# Patient Record
Sex: MALE | Race: WHITE | NOT HISPANIC OR LATINO | Employment: OTHER | URBAN - NONMETROPOLITAN AREA
[De-identification: names, ages, dates, MRNs, and addresses within clinical notes are randomized per-mention and may not be internally consistent; named-entity substitution may affect disease eponyms.]

---

## 2021-12-23 ENCOUNTER — APPOINTMENT (OUTPATIENT)
Dept: GENERAL RADIOLOGY | Facility: OTHER | Age: 71
End: 2021-12-23
Attending: EMERGENCY MEDICINE
Payer: MEDICARE

## 2021-12-23 ENCOUNTER — HOSPITAL ENCOUNTER (EMERGENCY)
Facility: OTHER | Age: 71
Discharge: HOME | End: 2021-12-23
Attending: EMERGENCY MEDICINE | Admitting: EMERGENCY MEDICINE
Payer: MEDICARE

## 2021-12-23 VITALS
HEART RATE: 75 BPM | WEIGHT: 152.3 LBS | SYSTOLIC BLOOD PRESSURE: 128 MMHG | BODY MASS INDEX: 26 KG/M2 | DIASTOLIC BLOOD PRESSURE: 66 MMHG | HEIGHT: 64 IN | OXYGEN SATURATION: 93 % | TEMPERATURE: 97.9 F | RESPIRATION RATE: 11 BRPM

## 2021-12-23 DIAGNOSIS — R07.89 ATYPICAL CHEST PAIN: ICD-10-CM

## 2021-12-23 LAB
ALBUMIN SERPL-MCNC: 4.6 G/DL (ref 3.5–5.7)
ALP SERPL-CCNC: 52 U/L (ref 34–104)
ALT SERPL W P-5'-P-CCNC: 30 U/L (ref 7–52)
ANION GAP SERPL CALCULATED.3IONS-SCNC: 8 MMOL/L (ref 3–14)
AST SERPL W P-5'-P-CCNC: 18 U/L (ref 13–39)
BASOPHILS # BLD AUTO: 0 10E3/UL (ref 0–0.2)
BASOPHILS NFR BLD AUTO: 1 %
BILIRUB SERPL-MCNC: 0.4 MG/DL (ref 0.3–1)
BUN SERPL-MCNC: 14 MG/DL (ref 7–25)
CALCIUM SERPL-MCNC: 10 MG/DL (ref 8.6–10.3)
CHLORIDE BLD-SCNC: 101 MMOL/L (ref 98–107)
CO2 SERPL-SCNC: 28 MMOL/L (ref 21–31)
CREAT SERPL-MCNC: 0.91 MG/DL (ref 0.7–1.3)
EOSINOPHIL # BLD AUTO: 0.2 10E3/UL (ref 0–0.7)
EOSINOPHIL NFR BLD AUTO: 3 %
ERYTHROCYTE [DISTWIDTH] IN BLOOD BY AUTOMATED COUNT: 13.1 % (ref 10–15)
GFR SERPL CREATININE-BSD FRML MDRD: 89 ML/MIN/1.73M2
GLUCOSE BLD-MCNC: 157 MG/DL (ref 70–105)
HCT VFR BLD AUTO: 43 % (ref 40–53)
HGB BLD-MCNC: 14.7 G/DL (ref 13.3–17.7)
HOLD SPECIMEN: NORMAL
HOLD SPECIMEN: NORMAL
IMM GRANULOCYTES # BLD: 0 10E3/UL
IMM GRANULOCYTES NFR BLD: 0 %
LYMPHOCYTES # BLD AUTO: 1 10E3/UL (ref 0.8–5.3)
LYMPHOCYTES NFR BLD AUTO: 19 %
MCH RBC QN AUTO: 28.5 PG (ref 26.5–33)
MCHC RBC AUTO-ENTMCNC: 34.2 G/DL (ref 31.5–36.5)
MCV RBC AUTO: 83 FL (ref 78–100)
MONOCYTES # BLD AUTO: 0.4 10E3/UL (ref 0–1.3)
MONOCYTES NFR BLD AUTO: 7 %
NEUTROPHILS # BLD AUTO: 3.8 10E3/UL (ref 1.6–8.3)
NEUTROPHILS NFR BLD AUTO: 70 %
NRBC # BLD AUTO: 0 10E3/UL
NRBC BLD AUTO-RTO: 0 /100
PLATELET # BLD AUTO: 225 10E3/UL (ref 150–450)
POTASSIUM BLD-SCNC: 4 MMOL/L (ref 3.5–5.1)
PROT SERPL-MCNC: 6.9 G/DL (ref 6.4–8.9)
RBC # BLD AUTO: 5.16 10E6/UL (ref 4.4–5.9)
SODIUM SERPL-SCNC: 137 MMOL/L (ref 134–144)
TROPONIN I SERPL-MCNC: 3.4 PG/ML (ref 0–34)
TROPONIN I SERPL-MCNC: 4 PG/ML (ref 0–34)
WBC # BLD AUTO: 5.4 10E3/UL (ref 4–11)

## 2021-12-23 PROCEDURE — 36415 COLL VENOUS BLD VENIPUNCTURE: CPT | Performed by: EMERGENCY MEDICINE

## 2021-12-23 PROCEDURE — 93010 ELECTROCARDIOGRAM REPORT: CPT | Performed by: INTERNAL MEDICINE

## 2021-12-23 PROCEDURE — 84484 ASSAY OF TROPONIN QUANT: CPT | Performed by: EMERGENCY MEDICINE

## 2021-12-23 PROCEDURE — 93005 ELECTROCARDIOGRAM TRACING: CPT | Performed by: EMERGENCY MEDICINE

## 2021-12-23 PROCEDURE — 99285 EMERGENCY DEPT VISIT HI MDM: CPT | Mod: 25 | Performed by: EMERGENCY MEDICINE

## 2021-12-23 PROCEDURE — 71045 X-RAY EXAM CHEST 1 VIEW: CPT

## 2021-12-23 PROCEDURE — 99283 EMERGENCY DEPT VISIT LOW MDM: CPT | Performed by: EMERGENCY MEDICINE

## 2021-12-23 PROCEDURE — 80053 COMPREHEN METABOLIC PANEL: CPT | Performed by: EMERGENCY MEDICINE

## 2021-12-23 PROCEDURE — 250N000013 HC RX MED GY IP 250 OP 250 PS 637: Performed by: EMERGENCY MEDICINE

## 2021-12-23 PROCEDURE — 85025 COMPLETE CBC W/AUTO DIFF WBC: CPT | Performed by: EMERGENCY MEDICINE

## 2021-12-23 RX ORDER — ASPIRIN 81 MG/1
324 TABLET, CHEWABLE ORAL ONCE
Status: COMPLETED | OUTPATIENT
Start: 2021-12-23 | End: 2021-12-23

## 2021-12-23 RX ADMIN — ASPIRIN 81 MG CHEWABLE TABLET 324 MG: 81 TABLET CHEWABLE at 10:18

## 2021-12-23 ASSESSMENT — ENCOUNTER SYMPTOMS
ARTHRALGIAS: 0
CHILLS: 0
LIGHT-HEADEDNESS: 0
AGITATION: 0
DYSURIA: 0
CHEST TIGHTNESS: 0
NAUSEA: 0
SHORTNESS OF BREATH: 0
FEVER: 0
VOMITING: 0

## 2021-12-23 ASSESSMENT — MIFFLIN-ST. JEOR: SCORE: 1338.89

## 2021-12-23 NOTE — ED TRIAGE NOTES
EMS Arrival Note  ________________________________  Uriel ELENA Virgen is a 71 year old Male that arrives via [facility] ambulance service from home  Pre hospital clinical presentation per EMS personnel includes Pt was our shoveling at 0830 and experience 4/10 sharp, midsternal chest pain that lasted for approx 10 minutes .  Pre hospital personnel report vital signs of:  B/P 167/83; HR 90, RR 18;SpO2 96 room air.  Pre Hospital Cardiac rhythm reported as Normal Sinus  Patient arrives with:  GCS Total = 15  Airway intact  Breathing Assessment Normal  Circulation Assessment Normal  Patient arrives with a 20g IV at his right anticubital.  Placed in room 903, gowned, warm blanket provided, side rails up,  ID verified and band placed, and call light within reach.       Previous living situation Spouse

## 2021-12-23 NOTE — ED PROVIDER NOTES
"  History     Chief Complaint   Patient presents with     Chest Pain     HPI  Uriel Virgen is a 71 year old male who comes in by ambulance after an episode of chest pain. He said he was shoveling snow this morning and right towards the end of that he started noticing some midsternal sharp chest pain. He went in and sat down. He got up to move around and it seemed to make it worse. It lasted longer than normal, as he has had episodes like this in the past. Called 911. While the paramedics were there the pain resolved without any intervention. He denies any other symptoms such as shortness of breath lightheaded dizziness diaphoresis nausea or palpitations. Has not been feeling ill recently. Eating and drinking normally.    Allergies:  No Known Allergies    Problem List:    There are no problems to display for this patient.       Past Medical History:    No past medical history on file.    Past Surgical History:    No past surgical history on file.    Family History:    No family history on file.    Social History:  Marital Status:   [2]  Social History     Tobacco Use     Smoking status: Not on file     Smokeless tobacco: Not on file   Substance Use Topics     Alcohol use: Not on file     Drug use: Not on file        Medications:    No current outpatient medications on file.        Review of Systems   Constitutional: Negative for chills and fever.   HENT: Negative for congestion.    Eyes: Negative for visual disturbance.   Respiratory: Negative for chest tightness and shortness of breath.    Cardiovascular: Positive for chest pain.   Gastrointestinal: Negative for nausea and vomiting.   Genitourinary: Negative for dysuria.   Musculoskeletal: Negative for arthralgias.   Skin: Negative for rash.   Neurological: Negative for light-headedness.   Psychiatric/Behavioral: Negative for agitation.       Physical Exam   BP: (!) 169/88  Pulse: 89  Temp: 97.9  F (36.6  C)  Resp: 17  Height: 161.3 cm (5' 3.5\")  Weight: " 69.1 kg (152 lb 4.8 oz)  SpO2: 97 %      Physical Exam  Vitals and nursing note reviewed.   Constitutional:       Appearance: He is well-developed.   HENT:      Head: Normocephalic and atraumatic.      Mouth/Throat:      Mouth: Mucous membranes are moist.   Eyes:      Conjunctiva/sclera: Conjunctivae normal.   Cardiovascular:      Rate and Rhythm: Normal rate and regular rhythm.      Heart sounds: Normal heart sounds.   Pulmonary:      Effort: Pulmonary effort is normal.      Breath sounds: Normal breath sounds.   Abdominal:      General: Abdomen is flat.   Skin:     General: Skin is warm and dry.   Neurological:      Mental Status: He is alert and oriented to person, place, and time.   Psychiatric:         Mood and Affect: Mood normal.         Behavior: Behavior normal.         ED Course                 Procedures         EKG shows normal sinus rhythm 77 bpm. No acute ST segment or T wave changes. No ectopy.       Results for orders placed or performed during the hospital encounter of 12/23/21 (from the past 24 hour(s))   CBC with platelets differential    Narrative    The following orders were created for panel order CBC with platelets differential.  Procedure                               Abnormality         Status                     ---------                               -----------         ------                     CBC with platelets and d...[[id number]]                      Final result                 Please view results for these tests on the individual orders.   Troponin I   Result Value Ref Range    Troponin I 4.0 0.0 - 34.0 pg/mL   Comprehensive metabolic panel   Result Value Ref Range    Sodium 137 134 - 144 mmol/L    Potassium 4.0 3.5 - 5.1 mmol/L    Chloride 101 98 - 107 mmol/L    Carbon Dioxide (CO2) 28 21 - 31 mmol/L    Anion Gap 8 3 - 14 mmol/L    Urea Nitrogen 14 7 - 25 mg/dL    Creatinine 0.91 0.70 - 1.30 mg/dL    Calcium 10.0 8.6 - 10.3 mg/dL    Glucose 157 (H) 70 - 105 mg/dL    Alkaline  Phosphatase 52 34 - 104 U/L    AST 18 13 - 39 U/L    ALT 30 7 - 52 U/L    Protein Total 6.9 6.4 - 8.9 g/dL    Albumin 4.6 3.5 - 5.7 g/dL    Bilirubin Total 0.4 0.3 - 1.0 mg/dL    GFR Estimate 89 >60 mL/min/1.73m2   CBC with platelets and differential   Result Value Ref Range    WBC Count 5.4 4.0 - 11.0 10e3/uL    RBC Count 5.16 4.40 - 5.90 10e6/uL    Hemoglobin 14.7 13.3 - 17.7 g/dL    Hematocrit 43.0 40.0 - 53.0 %    MCV 83 78 - 100 fL    MCH 28.5 26.5 - 33.0 pg    MCHC 34.2 31.5 - 36.5 g/dL    RDW 13.1 10.0 - 15.0 %    Platelet Count 225 150 - 450 10e3/uL    % Neutrophils 70 %    % Lymphocytes 19 %    % Monocytes 7 %    % Eosinophils 3 %    % Basophils 1 %    % Immature Granulocytes 0 %    NRBCs per 100 WBC 0 <1 /100    Absolute Neutrophils 3.8 1.6 - 8.3 10e3/uL    Absolute Lymphocytes 1.0 0.8 - 5.3 10e3/uL    Absolute Monocytes 0.4 0.0 - 1.3 10e3/uL    Absolute Eosinophils 0.2 0.0 - 0.7 10e3/uL    Absolute Basophils 0.0 0.0 - 0.2 10e3/uL    Absolute Immature Granulocytes 0.0 <=0.4 10e3/uL    Absolute NRBCs 0.0 10e3/uL   Extra Tube ([place] Draw)    Narrative    The following orders were created for panel order Extra Tube ([place] Draw).  Procedure                               Abnormality         Status                     ---------                               -----------         ------                     Extra Blue Top Tube[[id number]]                              Final result               Extra Serum Separator Tu...[[id number]]                      Final result                 Please view results for these tests on the individual orders.   Extra Blue Top Tube   Result Value Ref Range    Hold Specimen JIC    Extra Serum Separator Tube (SST)   Result Value Ref Range    Hold Specimen JIC    XR Chest Port 1 View    Narrative    PROCEDURE:  XR CHEST PORT 1 VIEW    HISTORY: chest pain. .    COMPARISON:  None.    FINDINGS:    The cardiomediastinal contours are magnified by technique.  No focal consolidation,  effusion or pneumothorax.      Impression    IMPRESSION:  No discrete consolidation.      SOHA LIANG MD         SYSTEM ID:  [id number]   Troponin I   Result Value Ref Range    Troponin I 3.4 0.0 - 34.0 pg/mL       Medications   aspirin (ASA) chewable tablet 324 mg (324 mg Oral Given 12/23/21 1018)       Assessments & Plan (with Medical Decision Making)     I have reviewed the nursing notes.    I have reviewed the findings, diagnosis, plan and need for follow up with the patient.  Patient has been symptom-free since prior to arrival.  Normal EKG and normal serial troponins.  I see no evidence for any acute cardiac etiology to explain his symptoms.  This could be stable angina and we did discuss this.  While here in the ER they called to his clinic and have made an appointment for follow-up in 1 weeks time.  I think it would be reasonable for him to get fairly urgent stress test.  Return sooner if he has worsening symptoms.    New Prescriptions    No medications on file       Final diagnoses:   Atypical chest pain       12/23/2021   [facility]     [address] MD  12/23/21 1216

## 2021-12-28 LAB
ATRIAL RATE - MUSE: 77 BPM
DIASTOLIC BLOOD PRESSURE - MUSE: NORMAL MMHG
INTERPRETATION ECG - MUSE: NORMAL
P AXIS - MUSE: 39 DEGREES
PR INTERVAL - MUSE: 148 MS
QRS DURATION - MUSE: 86 MS
QT - MUSE: 352 MS
QTC - MUSE: 398 MS
R AXIS - MUSE: 11 DEGREES
SYSTOLIC BLOOD PRESSURE - MUSE: NORMAL MMHG
T AXIS - MUSE: 18 DEGREES
VENTRICULAR RATE- MUSE: 77 BPM

## 2025-05-27 ENCOUNTER — TRANSFERRED RECORDS (OUTPATIENT)
Dept: HEALTH INFORMATION MANAGEMENT | Facility: OTHER | Age: 75
End: 2025-05-27
Payer: COMMERCIAL

## 2025-05-27 ENCOUNTER — MEDICAL CORRESPONDENCE (OUTPATIENT)
Dept: HEALTH INFORMATION MANAGEMENT | Facility: OTHER | Age: 75
End: 2025-05-27

## 2025-05-27 ENCOUNTER — MEDICAL CORRESPONDENCE (OUTPATIENT)
Dept: HEALTH INFORMATION MANAGEMENT | Facility: OTHER | Age: 75
End: 2025-05-27
Payer: COMMERCIAL

## 2025-06-02 ENCOUNTER — TRANSFERRED RECORDS (OUTPATIENT)
Dept: HEALTH INFORMATION MANAGEMENT | Facility: OTHER | Age: 75
End: 2025-06-02
Payer: COMMERCIAL

## 2025-06-11 ENCOUNTER — OFFICE VISIT (OUTPATIENT)
Dept: UROLOGY | Facility: OTHER | Age: 75
End: 2025-06-11
Payer: COMMERCIAL

## 2025-06-11 VITALS
HEART RATE: 72 BPM | BODY MASS INDEX: 27.03 KG/M2 | OXYGEN SATURATION: 98 % | RESPIRATION RATE: 16 BRPM | SYSTOLIC BLOOD PRESSURE: 124 MMHG | DIASTOLIC BLOOD PRESSURE: 80 MMHG | WEIGHT: 155 LBS | TEMPERATURE: 97.1 F

## 2025-06-11 DIAGNOSIS — N40.1 BENIGN PROSTATIC HYPERPLASIA WITH URINARY FREQUENCY: ICD-10-CM

## 2025-06-11 DIAGNOSIS — N47.6 BALANOPOSTHITIS: Primary | ICD-10-CM

## 2025-06-11 DIAGNOSIS — R35.0 URINARY FREQUENCY: ICD-10-CM

## 2025-06-11 DIAGNOSIS — R35.0 BENIGN PROSTATIC HYPERPLASIA WITH URINARY FREQUENCY: ICD-10-CM

## 2025-06-11 LAB
ALBUMIN UR-MCNC: 20 MG/DL
APPEARANCE UR: CLEAR
BILIRUB UR QL STRIP: NEGATIVE
COLOR UR AUTO: ABNORMAL
GLUCOSE UR STRIP-MCNC: 200 MG/DL
HGB UR QL STRIP: NEGATIVE
KETONES UR STRIP-MCNC: NEGATIVE MG/DL
LEUKOCYTE ESTERASE UR QL STRIP: NEGATIVE
NITRATE UR QL: NEGATIVE
PH UR STRIP: 5.5 [PH] (ref 5–9)
SP GR UR STRIP: 1.02 (ref 1–1.03)
UROBILINOGEN UR STRIP-MCNC: NORMAL MG/DL

## 2025-06-11 PROCEDURE — G0463 HOSPITAL OUTPT CLINIC VISIT: HCPCS | Mod: 25

## 2025-06-11 PROCEDURE — 81003 URINALYSIS AUTO W/O SCOPE: CPT | Mod: ZL

## 2025-06-11 PROCEDURE — 51798 US URINE CAPACITY MEASURE: CPT

## 2025-06-11 RX ORDER — GLIPIZIDE 5 MG/1
TABLET ORAL
COMMUNITY
Start: 2025-05-28

## 2025-06-11 RX ORDER — LATANOPROST 50 UG/ML
SOLUTION/ DROPS OPHTHALMIC
COMMUNITY
Start: 2024-09-12

## 2025-06-11 RX ORDER — TIMOLOL MALEATE 5 MG/ML
1 SOLUTION/ DROPS OPHTHALMIC 2 TIMES DAILY
COMMUNITY
Start: 2025-04-11

## 2025-06-11 RX ORDER — BLOOD SUGAR DIAGNOSTIC
STRIP MISCELLANEOUS
COMMUNITY
Start: 2025-02-04

## 2025-06-11 RX ORDER — ROSUVASTATIN CALCIUM 10 MG/1
0.5 TABLET, COATED ORAL
COMMUNITY
Start: 2025-05-27

## 2025-06-11 RX ORDER — LANCETS
EACH MISCELLANEOUS
COMMUNITY
Start: 2024-12-14

## 2025-06-11 RX ORDER — TAMSULOSIN HYDROCHLORIDE 0.4 MG/1
0.4 CAPSULE ORAL DAILY
Qty: 30 CAPSULE | Refills: 11 | Status: SHIPPED | OUTPATIENT
Start: 2025-06-11

## 2025-06-11 RX ORDER — CLOTRIMAZOLE AND BETAMETHASONE DIPROPIONATE 10; .64 MG/G; MG/G
CREAM TOPICAL 2 TIMES DAILY
Qty: 15 G | Refills: 1 | Status: SHIPPED | OUTPATIENT
Start: 2025-06-11 | End: 2025-06-18

## 2025-06-11 RX ORDER — LOSARTAN POTASSIUM 50 MG/1
1 TABLET ORAL
COMMUNITY
Start: 2025-05-16

## 2025-06-11 ASSESSMENT — PAIN SCALES - GENERAL: PAINLEVEL_OUTOF10: NO PAIN (0)

## 2025-06-11 NOTE — NURSING NOTE
"Chief Complaint   Patient presents with    Consult     Urinary frequency, BPH       Initial /80   Pulse 72   Temp 97.1  F (36.2  C) (Tympanic)   Resp 16   Wt 70.3 kg (155 lb)   SpO2 98%   BMI 27.03 kg/m   Estimated body mass index is 27.03 kg/m  as calculated from the following:    Height as of 12/23/21: 1.613 m (5' 3.5\").    Weight as of this encounter: 70.3 kg (155 lb).    AUA-15  PVR-70    Courtney Jones, HERIBERTO      "

## 2025-06-11 NOTE — PATIENT INSTRUCTIONS
Drink plenty of fluids, > 2 liters/day  Avoid constipation.  Consider Miralax Over the counter, metamucil or Citrucel with increased fiber in your diet  Limit bladder irritants including alcohol, caffeine, and heavily seasoned foods.  Consider double-voiding with the second void in the tripod position.   Start tamsulosin 0.4 mg daily at bedtime. Watch for dizziness with position changes.  Apply lotrisone twice daily for one week and then sparingly as needed up to two times daily there after.  Follow diabetic diet to aide with blood sugar management.  Limit fluids after 6 pm.  Follow up in 8 weeks.

## 2025-06-11 NOTE — PROGRESS NOTES
Chief Complaint: Consult (Urinary frequency, BPH)  .    HPI: Mr. Uriel Virgen is a 75 year old year old male with a history of diabetes who presents today June 11, 2025 for evaluation of urinary frequency.    Patient reports that he has had some difficulty pushing foreskin back after retracting. Notes that if the foreskin gets behind the glans penis it actually seems to stop the urinary flow due to the restrictive nature of the  foreskin. Patient also reports that two weeks ago he had some lesions on the side of the foreskin that were like paper cuts and burned with urination.     Patient reports medium pressure stream, consistent flow. Patient voids every 0.5-2 hours during the day and is getting up every 2-2.5 hours during the night. He does notice that he goes less when he is active.  Denies leakage. Endorses urinary urgency, urinary frequency, and dribbling. These symptoms started 1 year ago.     Patient drinks 36 ounces of water per day. Patient consumes 2 servings of caffeine in the form of  coffee per day.With the last beverage at 0900.  Drinks occasional alcohol-containing beverages. Does occasionally consume spicy foods. Regular daily bowel movements.         History reviewed. No pertinent past medical history.    History reviewed. No pertinent surgical history.    FAMILY HISTORY:  Endorses a family history of prostate cancer.      SOCIAL HISTORY:    reports that he has never smoked. He has never been exposed to tobacco smoke. He has never used smokeless tobacco.    Current Outpatient Medications   Medication Sig Dispense Refill    ACCU-CHEK GUIDE TEST test strip USE TO CHECK GLUCOSE TWICE DAILY      blood glucose monitoring (SOFTCLIX) lancets USE TO CHECK GLUCOSE ONCE DAILY      clotrimazole-betamethasone (LOTRISONE) 1-0.05 % external cream Apply topically 2 times daily for 7 days. And then sparingly as needed up to two times daily there after. 15 g 1    glipiZIDE (GLUCOTROL) 5 MG tablet TAKE 1 TABLET BY  MOUTH IN THE MORNING FOR 7 DAYS AND 1 TABLET TWICE DAILY . APPOINTMENT REQUIRED FOR FUTURE REFILLS      latanoprost (XALATAN) 0.005 % ophthalmic solution instill 1 drop into each eye at bedtime      losartan (COZAAR) 50 MG tablet Take 1 tablet by mouth daily at 2 pm.      metFORMIN (GLUCOPHAGE) 1000 MG tablet Take 1,000 mg by mouth 2 times daily (with meals).      rosuvastatin (CRESTOR) 10 MG tablet Take 0.5 tablets by mouth daily at 2 pm.      tamsulosin (FLOMAX) 0.4 MG capsule Take 1 capsule (0.4 mg) by mouth daily. Watch for dizziness upon standing. 30 capsule 11    timolol maleate (TIMOPTIC) 0.5 % ophthalmic solution Place 1 drop into both eyes 2 times daily.         ALLERGIES: Patient has no known allergies.     GENERAL PHYSICAL EXAM:   Vitals: /80   Pulse 72   Temp 97.1  F (36.2  C) (Tympanic)   Resp 16   Wt 70.3 kg (155 lb)   SpO2 98%   BMI 27.03 kg/m    Body mass index is 27.03 kg/m .    GENERAL: Well groomed, well developed, well nourished male in NAD.  HEENT:  Normal   CV:  Warm extremities   RESPIRATORY: Normal respiratory effort.    GI: Soft, NT, ND,  MS: Moving all four  NEURO: Alert and oriented x 3.  PSYCH: Normal mood and affect, pleasant and agreeable during interview and exam.    :  Uncircumcised male, no penile discharge or lesions.  Mild erythema to foreskin and glans penis.  Very faint scarring from previous ulceration. no scrotal swelling or discoloration. Bilaterally descended testes with smooth surface and no masses.   Prostate: declined.    PVR: Residual urine by ultrasound was 70 ml.      AUA Score: 15    RADIOLOGY: The following tests were reviewed: None.    LABS: The last test results for Ms. Uriel Virgen were reviewed.   Results for orders placed or performed in visit on 06/11/25 (from the past 24 hours)   Urinalysis Macroscopic   Result Value Ref Range    Color Urine Light Yellow Colorless, Straw, Light Yellow, Yellow    Appearance Urine Clear Clear    Glucose Urine 200  "(A) Negative mg/dL    Bilirubin Urine Negative Negative    Ketones Urine Negative Negative mg/dL    Specific Gravity Urine 1.023 1.000 - 1.030    Blood Urine Negative Negative    pH Urine 5.5 5.0 - 9.0    Protein Albumin Urine 20 (A) Negative mg/dL    Urobilinogen Urine Normal Normal mg/dL    Nitrite Urine Negative Negative    Leukocyte Esterase Urine Negative Negative       PSA - No results found for: \"PSA\"  BMP -   Recent Labs   Lab Test 12/23/21  1015      POTASSIUM 4.0   CHLORIDE 101   CO2 28   BUN 14   CR 0.91   *   RODRIGUEZ 10.0       CBC -   Recent Labs   Lab Test 12/23/21  1015   WBC 5.4   HGB 14.7          ASSESSMENT:   Erythema to glans penis and foreskin in the presence of a diabetic male with a mildly elevated A1c.  He has had urine frequency but no leakage.    PLAN:   1. Urinary frequency  I strongly encourage patient to work on following a diabetic diet and reducing blood glucoses I think this is heavily contributing to his urinary frequency.  I also recommended he limit bladder irritants and avoid constipation.  - Urinalysis Macroscopic  - MEASUREMENT, POST-VOIDING RESIDUAL URINE &/OR BLADDER CAPACITY, US, NON-IMAGING  - tamsulosin (FLOMAX) 0.4 MG capsule; Take 1 capsule (0.4 mg) by mouth daily. Watch for dizziness upon standing.  Dispense: 30 capsule; Refill: 11    2. Balanoposthitis (Primary)  Discussed exam findings and symptoms with Dr. Pulliam and he recommended initiating Lotrisone 2 times daily for 7 days and then sparingly as needed up to 2 times daily thereafter.  I educated the patient about using this sparingly and to cleanse prior to applying.  I encouraged him to ensure that the foreskin was pulled down after application.  We will reassess symptoms in 8 weeks.    - clotrimazole-betamethasone (LOTRISONE) 1-0.05 % external cream; Apply topically 2 times daily for 7 days. And then sparingly as needed up to two times daily there after.  Dispense: 15 g; Refill: 1    3. Benign " prostatic hyperplasia with urinary frequency  Discussion with patient about Benign Prostatic Hypertophy and enlargement of the male prostate with aging.  Explained the incidence of BPH which occurs in 40% of 40-year olds, 50% of 50-year olds, 60% of 60-year olds, etc.    Explained the importance of PSA testing and digital examination of the prostate (RUI).      Explained to patient that other conditions may mimic BPH including excessive caffeine consumption, alcohol consumption, constipation, spicy and acidic foods, stress, diabetes, obesity, and neurologic reasons such as Multiple Sclerosis and Parkinson's Disease.     Recommended behavioral therapy with reduction/elimination of caffeine and alcohol, certain foods/spices, avoidance of constipation and all cold and pain medications.      Discussed the use of alpha blockers such as tamsulosin. Side effects of alpha blockers discussed including retrograde ejaculation, ED, dizziness upon standing, fatigue and nasal congestion.    Discussed finasteride and dutasteride which work by reducing the size of the prostate over 6 to 12 months.  Side effects including diminished libido(centrally), erectile dysfunction, breast tenderness and a < 0.5% risk of developing high grade prostate cancer.     Finally discussed surgical therapies such as TURP, TUIP, Holep, Urolift, Rezum and Robotic assisted Simple Prostatectomy and their associated risks and benefits.      Patient voiced an understanding.  He would like to restart tamsulosin, we will see him back in 8 weeks to assess symptoms    - tamsulosin (FLOMAX) 0.4 MG capsule; Take 1 capsule (0.4 mg) by mouth daily. Watch for dizziness upon standing.  Dispense: 30 capsule; Refill: 11      46 minutes spent on the date of this encounter doing chart review, history and exam, documentation and further activities as noted above.      ROXANN Kenney [facility]

## 2025-07-22 DIAGNOSIS — N47.6 BALANOPOSTHITIS: ICD-10-CM

## 2025-07-22 DIAGNOSIS — R35.0 BENIGN PROSTATIC HYPERPLASIA WITH URINARY FREQUENCY: Primary | ICD-10-CM

## 2025-07-22 DIAGNOSIS — N40.1 BENIGN PROSTATIC HYPERPLASIA WITH URINARY FREQUENCY: Primary | ICD-10-CM

## 2025-08-08 ENCOUNTER — LAB (OUTPATIENT)
Dept: LAB | Facility: OTHER | Age: 75
End: 2025-08-08
Payer: COMMERCIAL

## 2025-08-08 DIAGNOSIS — N47.6 BALANOPOSTHITIS: ICD-10-CM

## 2025-08-08 DIAGNOSIS — N40.1 BENIGN PROSTATIC HYPERPLASIA WITH URINARY FREQUENCY: ICD-10-CM

## 2025-08-08 DIAGNOSIS — R35.0 BENIGN PROSTATIC HYPERPLASIA WITH URINARY FREQUENCY: ICD-10-CM

## 2025-08-08 LAB
ALBUMIN UR-MCNC: 10 MG/DL
APPEARANCE UR: CLEAR
BILIRUB UR QL STRIP: NEGATIVE
COLOR UR AUTO: ABNORMAL
GLUCOSE UR STRIP-MCNC: NEGATIVE MG/DL
HGB UR QL STRIP: NEGATIVE
KETONES UR STRIP-MCNC: 10 MG/DL
LEUKOCYTE ESTERASE UR QL STRIP: NEGATIVE
NITRATE UR QL: NEGATIVE
PH UR STRIP: 5.5 [PH] (ref 5–9)
SP GR UR STRIP: 1.03 (ref 1–1.03)
UROBILINOGEN UR STRIP-MCNC: NORMAL MG/DL

## 2025-08-08 PROCEDURE — 81003 URINALYSIS AUTO W/O SCOPE: CPT | Mod: ZL

## (undated) RX ORDER — ASPIRIN 81 MG/1
TABLET, CHEWABLE ORAL
Status: DISPENSED
Start: 2021-12-23